# Patient Record
Sex: MALE | URBAN - METROPOLITAN AREA
[De-identification: names, ages, dates, MRNs, and addresses within clinical notes are randomized per-mention and may not be internally consistent; named-entity substitution may affect disease eponyms.]

---

## 2023-11-16 ENCOUNTER — OFFICE VISIT (OUTPATIENT)
Dept: URBAN - METROPOLITAN AREA CLINIC 9 | Facility: CLINIC | Age: 68
End: 2023-11-16

## 2023-12-07 PROBLEM — 300331000: Status: ACTIVE | Noted: 2023-12-07

## 2023-12-08 ENCOUNTER — DASHBOARD ENCOUNTERS (OUTPATIENT)
Age: 68
End: 2023-12-08

## 2023-12-08 ENCOUNTER — OFFICE VISIT (OUTPATIENT)
Dept: URBAN - METROPOLITAN AREA CLINIC 9 | Facility: CLINIC | Age: 68
End: 2023-12-08
Payer: MEDICARE

## 2023-12-08 VITALS
BODY MASS INDEX: 30.94 KG/M2 | SYSTOLIC BLOOD PRESSURE: 123 MMHG | DIASTOLIC BLOOD PRESSURE: 69 MMHG | WEIGHT: 221 LBS | HEIGHT: 71 IN

## 2023-12-08 DIAGNOSIS — K76.9 LIVER LESION: ICD-10-CM

## 2023-12-08 PROCEDURE — 99204 OFFICE O/P NEW MOD 45 MIN: CPT | Performed by: STUDENT IN AN ORGANIZED HEALTH CARE EDUCATION/TRAINING PROGRAM

## 2023-12-08 RX ORDER — LIDOCAINE 3.5 G/1
1 PATCH AS NEEDED PATCH TOPICAL ONCE A DAY
Status: ACTIVE | COMMUNITY
Start: 2023-12-08

## 2023-12-08 RX ORDER — WARFARIN SODIUM 5 MG/1
6MG TABLET ORAL ONCE A DAY
Status: ACTIVE | COMMUNITY
Start: 2023-12-08

## 2023-12-08 RX ORDER — GABAPENTIN 600 MG/1
1 TABLET TABLET, FILM COATED ORAL
Status: ACTIVE | COMMUNITY

## 2023-12-08 NOTE — HPI-TODAY'S VISIT:
Patient has a history of chronic back pain with pain pump, thyroid goiter, hx fo TIA, hx of DVT/PE on warfarin,  who presents for liver lesion  GI Hx: 12/8/23- Asymptomatic. Reviewed all hospital records. Discussed report with patient.   ROS includes and is negative for the below, unless specified positive above: Denies weight loss, fever, chills, abdominal pain, nausea, vomiting, diarrhea.  Denies dysphagia, reflux, UGI symptoms. Denies hematemesis, melena, hematochezia, blood per rectum.  Family hx: No GI cancers or IBD  EGD: No recent  Colonoscopy: No recent  Imaging/Studies/Procedures: CT A/P 8/12/23- No discrete catheter separation or disconnection. Irregular 3.6 cm low-density liver lesion in the dorsal right lobe, etiology unclear. Consider multiphase liver MRI with Eovist for further evaluation. Chronic interstitial lung changes with peripheral groundglass opacities and interstitial reticulation. Mildly enlarged prostate. 9/4/23- CBC, CMP, UA, INR 1.3 (H),

## 2023-12-11 LAB
A/G RATIO: 1.2
ABSOLUTE BASOPHILS: 61
ABSOLUTE EOSINOPHILS: 139
ABSOLUTE LYMPHOCYTES: 2497
ABSOLUTE MONOCYTES: 661
ABSOLUTE NEUTROPHILS: 5342
AFP, SERUM, TUMOR MARKER: 1.4
ALBUMIN: 3.9
ALKALINE PHOSPHATASE: 65
ALT (SGPT): 9
AST (SGOT): 14
BASOPHILS: 0.7
BILIRUBIN, TOTAL: 0.4
BUN/CREATININE RATIO: (no result)
BUN: 23
CALCIUM: 9.1
CARBON DIOXIDE, TOTAL: 33
CHLORIDE: 105
CREATININE: 0.94
EGFR: 87
EOSINOPHILS: 1.6
GLOBULIN, TOTAL: 3.3
GLUCOSE: 91
HEMATOCRIT: 48.1
HEMOGLOBIN: 16.3
INR: 2.1
LYMPHOCYTES: 28.7
MCH: 31.4
MCHC: 33.9
MCV: 92.7
MONOCYTES: 7.6
MPV: 10.4
NEUTROPHILS: 61.4
PLATELET COUNT: 212
POTASSIUM: 5.2
PROTEIN, TOTAL: 7.2
PT: 20.8
RDW: 13.1
RED BLOOD CELL COUNT: 5.19
SODIUM: 143
WHITE BLOOD CELL COUNT: 8.7

## 2024-01-24 ENCOUNTER — TELEPHONE ENCOUNTER (OUTPATIENT)
Dept: URBAN - METROPOLITAN AREA CLINIC 23 | Facility: CLINIC | Age: 69
End: 2024-01-24